# Patient Record
Sex: FEMALE | Race: WHITE
[De-identification: names, ages, dates, MRNs, and addresses within clinical notes are randomized per-mention and may not be internally consistent; named-entity substitution may affect disease eponyms.]

---

## 2020-09-10 ENCOUNTER — HOSPITAL ENCOUNTER (EMERGENCY)
Dept: HOSPITAL 97 - ER | Age: 25
Discharge: HOME | End: 2020-09-10
Payer: SELF-PAY

## 2020-09-10 DIAGNOSIS — F17.210: ICD-10-CM

## 2020-09-10 DIAGNOSIS — F15.93: ICD-10-CM

## 2020-09-10 DIAGNOSIS — R10.84: Primary | ICD-10-CM

## 2020-09-10 DIAGNOSIS — Z88.2: ICD-10-CM

## 2020-09-10 LAB
ALBUMIN SERPL BCP-MCNC: 4.2 G/DL (ref 3.4–5)
ALP SERPL-CCNC: 60 U/L (ref 45–117)
ALT SERPL W P-5'-P-CCNC: 22 U/L (ref 12–78)
AST SERPL W P-5'-P-CCNC: 10 U/L (ref 15–37)
BUN BLD-MCNC: 5 MG/DL (ref 7–18)
GLUCOSE SERPLBLD-MCNC: 95 MG/DL (ref 74–106)
HCT VFR BLD CALC: 37.5 % (ref 36–45)
INR BLD: 1.11
LYMPHOCYTES # SPEC AUTO: 2.2 K/UL (ref 0.7–4.9)
METHAMPHET UR QL SCN: NEGATIVE
PMV BLD: 8.4 FL (ref 7.6–11.3)
POTASSIUM SERPL-SCNC: 2.9 MMOL/L (ref 3.5–5.1)
RBC # BLD: 4.31 M/UL (ref 3.86–4.86)
THC SERPL-MCNC: NEGATIVE NG/ML

## 2020-09-10 PROCEDURE — 74177 CT ABD & PELVIS W/CONTRAST: CPT

## 2020-09-10 PROCEDURE — 80320 DRUG SCREEN QUANTALCOHOLS: CPT

## 2020-09-10 PROCEDURE — 93005 ELECTROCARDIOGRAM TRACING: CPT

## 2020-09-10 PROCEDURE — 81025 URINE PREGNANCY TEST: CPT

## 2020-09-10 PROCEDURE — 85610 PROTHROMBIN TIME: CPT

## 2020-09-10 PROCEDURE — 96361 HYDRATE IV INFUSION ADD-ON: CPT

## 2020-09-10 PROCEDURE — 85730 THROMBOPLASTIN TIME PARTIAL: CPT

## 2020-09-10 PROCEDURE — 36415 COLL VENOUS BLD VENIPUNCTURE: CPT

## 2020-09-10 PROCEDURE — 80048 BASIC METABOLIC PNL TOTAL CA: CPT

## 2020-09-10 PROCEDURE — 80307 DRUG TEST PRSMV CHEM ANLYZR: CPT

## 2020-09-10 PROCEDURE — 80329 ANALGESICS NON-OPIOID 1 OR 2: CPT

## 2020-09-10 PROCEDURE — 81003 URINALYSIS AUTO W/O SCOPE: CPT

## 2020-09-10 PROCEDURE — 85025 COMPLETE CBC W/AUTO DIFF WBC: CPT

## 2020-09-10 PROCEDURE — 99284 EMERGENCY DEPT VISIT MOD MDM: CPT

## 2020-09-10 PROCEDURE — 80076 HEPATIC FUNCTION PANEL: CPT

## 2020-09-10 PROCEDURE — 96374 THER/PROPH/DIAG INJ IV PUSH: CPT

## 2020-09-10 NOTE — EDPHYS
Physician Documentation                                                                           

 [facility]                                                                 

Name: Ирина Vergara                                                                                  

Age: 25 yrs                                                                                       

Sex: Female                                                                                       

: 1995                                                                                   

MRN: [id number]                                                                                   

Arrival Date: 09/10/2020                                                                          

Time: 18:49                                                                                       

Account#: [id number]                                                                            

Bed 23                                                                                            

Private MD:                                                                                       

ED Physician Constantino Patel                                                                      

HPI:                                                                                              

09/10                                                                                             

19:30 This 25 yrs old  Female presents to ER via Ambulatory with complaints of Drug  jmm 

      Withdrawal.                                                                                 

19:30 The patient presents to the emergency department with nausea, vomiting, abdominal pain. jmm 

      Onset: The symptoms/episode began/occurred gradually, 2 day(s) ago. Possible causes:        

      drug withdrawal. The symptoms are aggravated by nothing. The symptoms are alleviated by     

      nothing. This is a 25 year old female with no chronic medical conditions that presents      

      to the ED with complaints of nausea, vomiting, abdominal pain beginning approx 1 day        

      ago. Patient states she is in rehab for heroin withdrawal, last time taken was two days     

      ago. Denies any other drug use. .                                                           

                                                                                                  

OB/GYN:                                                                                           

19:16 unknown                                                                                 ca1 

19:16 LMP N/A - unknown                                                                       ca1 

                                                                                                  

Historical:                                                                                       

- Allergies:                                                                                      

19:15 Sulfa (Sulfonamide Antibiotics);                                                        ca1 

- Home Meds:                                                                                      

19:15 None [Active];                                                                          ca1 

- PMHx:                                                                                           

19:15 None;                                                                                   ca1 

- PSHx:                                                                                           

19:15 None;                                                                                   ca1 

                                                                                                  

- Immunization history:: Adult Immunizations up to date.                                          

- Social history:: Smoking status: Patient reports the use of cigarette tobacco                   

  products, smokes one-half pack cigarettes per day, Patient uses IV drugs, heroin,               

  Patient/guardian denies using alcohol.                                                          

                                                                                                  

                                                                                                  

ROS:                                                                                              

19:30 Constitutional: Negative for fever, chills, and weight loss, Cardiovascular: Negative   jmm 

      for chest pain, palpitations, and edema, Respiratory: Negative for shortness of breath,     

      cough, wheezing, and pleuritic chest pain.                                                  

19:30 Abdomen/GI: Positive for abdominal pain, nausea and vomiting.                               

19:30 MS/extremity:                                                                               

19:30 All other systems are negative.                                                             

                                                                                                  

Exam:                                                                                             

19:30 Constitutional:  This is a well developed, well nourished patient who is awake, alert,  jmm 

      and in no acute distress. Head/Face:  atraumatic. Eyes:  EOMI, no conjunctival erythema     

      appreciated ENT:  Moist Mucus Membranes Neck:  Trachea midline, Supple Chest/axilla:        

      Normal chest wall appearance and motion.   Cardiovascular:  Regular rate and rhythm.        

      No edema appreciated Respiratory:  Normal respirations, no respiratory distress             

      appreciated                                                                                 

19:30 Abdomen/GI: Inspection: abdomen appears normal, Bowel sounds: normal, Palpation: soft,      

      mild abdominal tenderness, in the umbilical area, right upper quadrant, left upper          

      quadrant, right lower quadrant, left lower quadrant and abdomen diffusely.                  

19:30 Musculoskeletal/extremity: ROM: intact in all extremities.                                  

19:30 Skin: Appearance: Color: normal in color.                                                   

19:30 Neuro: Orientation: is normal, Mentation: is normal, Memory: is normal.                     

19:30 Psych: Behavior/mood is pleasant, cooperative.                                              

                                                                                                  

Vital Signs:                                                                                      

19:12  / 84; Pulse 99; Resp 16 S; Temp 98.1(TE); Pulse Ox 100% on R/A; Weight 58.06 kg  ca1 

      (R); Height 5 ft. 3 in. (160.02 cm) (R);                                                    

20:00  / 60; Pulse 78; Resp 16; Pulse Ox 99% on R/A; Pain 0/10;                         ls4 

22:00  / 60; Pulse 74; Resp 16; Temp 98.4(O); Pulse Ox 99% on R/A; Pain 3/10;           ls4 

19:12 Body Mass Index 22.67 (58.06 kg, 160.02 cm)                                             ca1 

                                                                                                  

MDM:                                                                                              

20:05 Patient medically screened.                                                             [facility] 

22:52 Data reviewed: vital signs, nurses notes. Counseling: I had a detailed discussion with  jhoana 

      the patient and/or guardian regarding: the historical points, exam findings, and any        

      diagnostic results supporting the discharge/admit diagnosis, lab results, radiology         

      results, the need for outpatient follow up, to return to the emergency department if        

      symptoms worsen or persist or if there are any questions or concerns that arise at          

      home. ED course: Patient is able to tolerate PO in the ED. CT negative. Patient is          

      advised to follow up with pcp and otherwise given strict return precautions. Patient        

      understood and agrees with the plan of care. .                                              

                                                                                                  

09/10                                                                                             

19:30 Order name: Acetaminophen; Complete Time: 20:47                                         [facility] 

09/10                                                                                             

19:30 Order name: BMP; Complete Time: 20:47                                                   [facility] 

09/10                                                                                             

19:30 Order name: CBC with Diff; Complete Time: 20:12                                         [facility] 

09/10                                                                                             

19:30 Order name: Ethanol; Complete Time: 20:47                                               [facility] 

09/10                                                                                             

19:30 Order name: Hepatic Function; Complete Time: 20:47                                      [facility] 

09/10                                                                                             

19:30 Order name: Protime (+inr); Complete Time: 20:47                                        [facility] 

09/10                                                                                             

19:30 Order name: Ptt, Activated; Complete Time: 20:47                                        [facility] 

09/10                                                                                             

19:30 Order name: Salicylate; Complete Time: 20:47                                            [facility] 

09/10                                                                                             

19:30 Order name: Urine Drug Screen; Complete Time: 21:20                                     [facility] 

09/10                                                                                             

20:13 Order name: CT Abd/Pelvis - IV Contrast Only; Complete Time: 20:57                      [facility] 

09/10                                                                                             

20:50 Order name: Urine Pregnancy--Ancillary (enter results); Complete Time: 21:20            [facility] 

09/10                                                                                             

20:50 Order name: Urine Dipstick--Ancillary (enter results); Complete Time: 21:20             [facility] 

09/10                                                                                             

19:30 Order name: EKG; Complete Time: 19:31                                                   [facility] 

09/10                                                                                             

19:30 Order name: EKG - Nurse/Tech; Complete Time: 23:14                                      [facility] 

09/10                                                                                             

19:30 Order name: IV Saline Lock; Complete Time: 20:51                                        [facility] 

09/10                                                                                             

19:30 Order name: Labs collected and sent; Complete Time: 20:51                               [facility] 

09/10                                                                                             

19:30 Order name: O2 Per Protocol; Complete Time: 20:51                                       [facility] 

09/10                                                                                             

19:30 Order name: O2 Sat Monitoring; Complete Time: 22:16                                     [facility] 

09/10                                                                                             

19:30 Order name: Urine Dipstick-Ancillary (obtain specimen); Complete Time: 23:14            [facility] 

09/10                                                                                             

20:14 Order name: Urine Pregnancy Test (obtain specimen); Complete Time: 20:48                [facility] 

09/10                                                                                             

21:43 Order name: PO challenge; Complete Time: 22:15                                          jm 

                                                                                                  

Administered Medications:                                                                         

20:48 Drug: Zofran (Ondansetron) 4 mg Route: IVP; Site: right antecubital;                    ls4 

22:15 Follow up: Response: No adverse reaction; Marked relief of symptoms                     ls4 

20:50 Drug: NS 0.9% 1000 ml Route: IV; Rate: 1 bolus; Site: right antecubital;                ls4 

22:16 Follow up: IV Status: Completed infusion; IV Intake: 1000ml                             ls4 

                                                                                                  

                                                                                                  

Disposition:                                                                                      

                                                                                             

05:47 Co-signature as Attending Physician, Constantino Patel MD.                                 [id number] 

                                                                                                  

Disposition:                                                                                      

09/10/20 22:53 Discharged to Home. Impression: Generalized abdominal pain, Vomiting.              

- Condition is Stable.                                                                            

- Discharge Instructions: Abdominal Pain, Adult, Nausea and Vomiting, Adult.                      

- Prescriptions for Zofran ODT 4 mg Oral tablet,disintegrating - place 1 tablet by                

  TRANSLINGUAL route every 4-6 hours; 20 tablet.                                                  

- Medication Reconciliation Form, Thank You Letter, Antibiotic Education, Prescription            

  Opioid Use form.                                                                                

- Follow up: Private Physician; When: 2 - 3 days; Reason: Recheck today's complaints,             

  Continuance of care, Re-evaluation by your physician.                                           

                                                                                                  

                                                                                                  

                                                                                                  

Signatures:                                                                                       

Dispatcher [employer]                           EDMS                                                 

Regulo Anderson PA PA jmm Stewart, Lisa, RN                       RN   ls4                                                  

Suzanne Gray RN                        RN   ca1                                                  

Constantino Patel MD MD   [id number]                                                  

                                                                                                  

Corrections: (The following items were deleted from the chart)                                    

09/10                                                                                             

23:47 22:53 09/10/2020 22:53 Discharged to Home. Impression: Generalized abdominal pain;      ls4 

      Vomiting. Condition is Stable. Forms are Medication Reconciliation Form, Thank You          

      Letter, Antibiotic Education, Prescription Opioid Use. Follow up: Private Physician;        

      When: 2 - 3 days; Reason: Recheck today's complaints, Continuance of care,                  

      Re-evaluation by your physician. jhoana                                                        

                                                                                                  

**************************************************************************************************

## 2020-09-10 NOTE — ER
Nurse's Notes                                                                                     

 [facility]                                                                 

Name: Ирина Vergara                                                                                  

Age: 25 yrs                                                                                       

Sex: Female                                                                                       

: 1995                                                                                   

MRN: [id number]                                                                                   

Arrival Date: 09/10/2020                                                                          

Time: 18:49                                                                                       

Account#: [id number]                                                                            

Bed 23                                                                                            

Private MD:                                                                                       

Diagnosis: Generalized abdominal pain;Vomiting                                                    

                                                                                                  

Presentation:                                                                                     

09/10                                                                                             

19:12 Chief complaint: Patient states: I am on rehab and I am detoxing. I can't keep anything ca1 

      down, can't drink, can't eat, diarrhea and stomach cramps. Abused opiates and heroin.       

      Last used was Tuesday, 2 days ago. Coronavirus screen: Client denies travel out of the      

      [place] in the last 14 days. At this time, the client does not indicate any symptoms           

      associated with coronavirus-19. Ebola Screen: Patient negative for fever greater than       

      or equal to 101.5 degrees Fahrenheit, and additional compatible Ebola Virus Disease         

      symptoms Patient denies exposure to infectious person. Patient denies travel to an          

      Ebola-affected area in the 21 days before illness onset. No symptoms or risks               

      identified at this time. Initial Sepsis Screen: Does the patient meet any 2 criteria?       

      No. Patient's initial sepsis screen is negative. Does the patient have a suspected          

      source of infection? No. Patient's initial sepsis screen is negative. Risk Assessment:      

      Do you want to hurt yourself or someone else? Patient reports no desire to harm self or     

      others. Onset of symptoms was September 10, 2020.                                           

19:12 Method Of Arrival: Ambulatory                                                           ca1 

19:12 Acuity: FAM 3                                                                           ca1 

                                                                                                  

Triage Assessment:                                                                                

23:22 General: Appears in no apparent distress. comfortable.                                  ls4 

                                                                                                  

OB/GYN:                                                                                           

19:16 unknown                                                                                 ca1 

19:16 LMP N/A - unknown                                                                       ca1 

                                                                                                  

Historical:                                                                                       

- Allergies:                                                                                      

19:15 Sulfa (Sulfonamide Antibiotics);                                                        ca1 

- Home Meds:                                                                                      

19:15 None [Active];                                                                          ca1 

- PMHx:                                                                                           

19:15 None;                                                                                   ca1 

- PSHx:                                                                                           

19:15 None;                                                                                   ca1 

                                                                                                  

- Immunization history:: Adult Immunizations up to date.                                          

- Social history:: Smoking status: Patient reports the use of cigarette tobacco                   

  products, smokes one-half pack cigarettes per day, Patient uses IV drugs, heroin,               

  Patient/guardian denies using alcohol.                                                          

                                                                                                  

                                                                                                  

Screenin:32 Abuse screen: Denies threats or abuse. Denies injuries from another. Nutritional        ls4 

      screening: No deficits noted. Tuberculosis screening: No symptoms or risk factors           

      identified. Fall Risk None identified.                                                      

                                                                                                  

Assessment:                                                                                       

                                                                                             

23:30 Reassessment: Patient appears in no apparent distress at this time.                     ls4 

09/10                                                                                             

19:30 General: Behavior is cooperative, flat. Pain: Denies pain. Cardiovascular: No deficits  ls4 

      noted. Rhythm is regular Chest pain is denied. Respiratory: No deficits noted. Breath       

      sounds are clear bilaterally. GI: Abdomen is non-distended, Bowel sounds present X 4        

      quads. GI: Reports nausea. : No deficits noted. No signs and/or symptoms were             

      reported regarding the genitourinary system.                                                

20:30 Reassessment: Patient appears in no apparent distress at this time. Patient and/or      ls4 

      family updated on plan of care and expected duration. Pain level reassessed. Patient is     

      alert, oriented x 3, equal unlabored respirations, skin warm/dry/pink.                      

21:30 Reassessment: Patient appears in no apparent distress at this time. Patient and/or      ls4 

      family updated on plan of care and expected duration. Pain level reassessed. Patient is     

      alert, oriented x 3, equal unlabored respirations, skin warm/dry/pink.                      

22:30 Reassessment: Patient appears in no apparent distress at this time. Patient and/or      ls4 

      family updated on plan of care and expected duration. Pain level reassessed. Patient is     

      alert, oriented x 3, equal unlabored respirations, skin warm/dry/pink.                      

23:19 Reassessment: Patient appears in no apparent distress at this time. Patient and/or      ls4 

      family updated on plan of care and expected duration. Pain level reassessed. Patient is     

      alert, oriented x 3, equal unlabored respirations, skin warm/dry/pink.                      

                                                                                                  

Vital Signs:                                                                                      

19:12  / 84; Pulse 99; Resp 16 S; Temp 98.1(TE); Pulse Ox 100% on R/A; Weight 58.06 kg  ca1 

      (R); Height 5 ft. 3 in. (160.02 cm) (R);                                                    

20:00  / 60; Pulse 78; Resp 16; Pulse Ox 99% on R/A; Pain 0/10;                         ls4 

22:00  / 60; Pulse 74; Resp 16; Temp 98.4(O); Pulse Ox 99% on R/A; Pain 3/10;           ls4 

19:12 Body Mass Index 22.67 (58.06 kg, 160.02 cm)                                             ca1 

                                                                                                  

ED Course:                                                                                        

18:49 Patient arrived in ED.                                                                  as  

19:14 Triage completed.                                                                       ca1 

19:15 Arm band placed on right wrist.                                                         ca1 

19:22 Patient has correct armband on for positive identification. Bed in low position. Call   ls4 

      light in reach. Side rails up X 1.                                                          

19:26 No provider procedures requiring assistance completed. Inserted saline lock: 20 gauge   ls4 

      in right antecubital area, using aseptic technique.                                         

19:29 Regulo Anderson PA is PHCP.                                                              [facility] 

19:29 Constantino Patel MD is Attending Physician.                                             jhoana 

19:31 Katie Hobbs, RN is Primary Nurse.                                                     ls4 

20:46 CT Abd/Pelvis - IV Contrast Only In Process Unspecified.                                EDMS

22:30 Pulse ox on. NIBP on. Diet: Patient given water. Tolerated well.                        ls4 

23:23 IV discontinued, intact, bleeding controlled, No redness/swelling at site. Pressure     ls4 

      dressing applied.                                                                           

                                                                                                  

Administered Medications:                                                                         

20:48 Drug: Zofran (Ondansetron) 4 mg Route: IVP; Site: right antecubital;                    ls4 

22:15 Follow up: Response: No adverse reaction; Marked relief of symptoms                     ls4 

20:50 Drug: NS 0.9% 1000 ml Route: IV; Rate: 1 bolus; Site: right antecubital;                ls4 

22:16 Follow up: IV Status: Completed infusion; IV Intake: 1000ml                             ls4 

                                                                                                  

                                                                                                  

Intake:                                                                                           

22:16 IV: 1000ml; Total: 1000ml.                                                              ls4 

                                                                                                  

Outcome:                                                                                          

22:53 Discharge ordered by MD.                                                                [facility] 

23:23 Discharged to home ambulatory.                                                          ls4 

23:23 Condition: good                                                                             

23:23 Discharge instructions given to patient, Instructed on discharge instructions, follow       

      up and referral plans. medication usage, Demonstrated understanding of instructions,        

      follow-up care, medications, Prescriptions given X 1.                                       

23:47 Patient left the ED.                                                                    ls4 

                                                                                                  

Signatures:                                                                                       

Dispatcher [employer]                           EDMS                                                 

Regulo Anderson PA PA jmm Martinez, Amelia as Stewart, Lisa, RN                       RN   ls4                                                  

Suzanne Gray RN                        RN   ca1                                                  

                                                                                                  

**************************************************************************************************

## 2020-09-10 NOTE — RAD REPORT
EXAM DESCRIPTION:  CT - Abdomen   Pelvis W Contrast - 9/10/2020 8:45 pm

 

CLINICAL HISTORY:  abdominal pain

 

COMPARISON:  No comparisons

 

TECHNIQUE:  Biphasic, helical CT imaging of the abdomen and pelvis was performed following 100 ml non
-ionic IV contrast.

 

No oral contrast.

 

All CT scans are performed using dose optimization technique as appropriate and may include automated
 exposure control or mA/KV adjustment according to patient size.

 

FINDINGS:  No suspicious findings in the lung bases.

 

The liver, spleen, and pancreas show no suspicious findings. Gallbladder and biliary tree are also wi
thout suspicious finding.

 

Symmetric renal function is seen with no hydronephrosis or suspicious renal mass. No pyelonephritis o
r acute parenchymal process. No bladder abnormalities. No adrenal abnormalities.

 

No dilated bowel loops or bowel wall thickening. No appendicitis. No free air or pneumatosis. Minimal
 free fluid is seen cul de sac well within physiologic limits. Uterus and ovaries show no suspicious 
findings.  No hernia, mass or bulky lymphadenopathy.

 

No suspicious bony findings.

 

 

IMPRESSION:  Contrast enhanced CT abdomen and pelvis showing no significant or suspicious finding.

## 2020-09-11 VITALS — SYSTOLIC BLOOD PRESSURE: 122 MMHG | TEMPERATURE: 98.4 F | DIASTOLIC BLOOD PRESSURE: 60 MMHG

## 2020-09-11 VITALS — OXYGEN SATURATION: 99 %
